# Patient Record
Sex: MALE | Race: BLACK OR AFRICAN AMERICAN | NOT HISPANIC OR LATINO | Employment: FULL TIME | URBAN - METROPOLITAN AREA
[De-identification: names, ages, dates, MRNs, and addresses within clinical notes are randomized per-mention and may not be internally consistent; named-entity substitution may affect disease eponyms.]

---

## 2023-05-15 ENCOUNTER — OFFICE VISIT (OUTPATIENT)
Dept: PRIMARY CARE | Facility: CLINIC | Age: 43
End: 2023-05-15
Payer: COMMERCIAL

## 2023-05-15 VITALS
WEIGHT: 315 LBS | RESPIRATION RATE: 16 BRPM | SYSTOLIC BLOOD PRESSURE: 130 MMHG | DIASTOLIC BLOOD PRESSURE: 90 MMHG | BODY MASS INDEX: 44.6 KG/M2 | HEART RATE: 70 BPM | TEMPERATURE: 99.2 F

## 2023-05-15 DIAGNOSIS — D50.9 IRON DEFICIENCY ANEMIA, UNSPECIFIED IRON DEFICIENCY ANEMIA TYPE: ICD-10-CM

## 2023-05-15 DIAGNOSIS — E78.49 OTHER HYPERLIPIDEMIA: ICD-10-CM

## 2023-05-15 DIAGNOSIS — G47.33 OBSTRUCTIVE SLEEP APNEA SYNDROME: ICD-10-CM

## 2023-05-15 DIAGNOSIS — M75.112 INCOMPLETE TEAR OF LEFT ROTATOR CUFF, UNSPECIFIED WHETHER TRAUMATIC: ICD-10-CM

## 2023-05-15 DIAGNOSIS — R73.03 PREDIABETES: ICD-10-CM

## 2023-05-15 DIAGNOSIS — R03.0 BLOOD PRESSURE ELEVATED WITHOUT HISTORY OF HTN: Primary | ICD-10-CM

## 2023-05-15 PROBLEM — D64.9 ANEMIA: Status: ACTIVE | Noted: 2023-05-15

## 2023-05-15 PROBLEM — N52.9 IMPOTENCE: Status: ACTIVE | Noted: 2023-05-15

## 2023-05-15 PROBLEM — E66.01 MORBID OBESITY (MULTI): Status: ACTIVE | Noted: 2023-05-15

## 2023-05-15 PROBLEM — E78.5 HYPERLIPIDEMIA: Status: ACTIVE | Noted: 2023-05-15

## 2023-05-15 PROCEDURE — 99396 PREV VISIT EST AGE 40-64: CPT | Performed by: INTERNAL MEDICINE

## 2023-05-15 RX ORDER — CEPHALEXIN 500 MG/1
500 CAPSULE ORAL EVERY 6 HOURS
Qty: 28 CAPSULE | Refills: 0 | COMMUNITY
Start: 2023-03-30 | End: 2023-04-06

## 2023-05-15 NOTE — PROGRESS NOTES
Jose Maria Apodaca is a 43 y.o. male   NPV    Patient with a past medical history of Elevated BP without diagnosis of hypertension, Prediabetes, ID Anemia, Vit D Deficiency, Morbid Obesity    C/o left shoulder pain  C/o food getting stuck in throat, lasted for a month, but then got better  Wife says he snores and stops breathing  Blood on wipe  No stomach/ pain     No chest pain/  SOB/ dizziness  BM OK  Energy level ok  Appetite OK             Review of Systems     Constitutional: not feeling poorly, no fever, no recent weight gain and no recent weight loss. Morbidly Obese  Eyes: no blurred vision and no diplopia.   ENT: no hearing loss, no tinnitus, no earache, no sore throat, no hoarseness and no swollen glands in the neck.   Cardiovascular: no chest pain, no tightness or heavy pressure, no shortness of breath, no palpitations and no lower extremity edema.   Respiratory: no cough, wheezing or shortness of breath at rest or exertion  Gastrointestinal: no change in bowel habits, no diarrhea, no constipation, no bloody stools, no nausea, no vomiting, no abdominal pain, no signs and symptoms of ulcer disease, no nasim colored stools and no intolerance to fatty foods.   Genitourinary: no urinary frequency, no dysuria, no hematuria, no burning sensation during urination, urinary stream is not smaller and urinary stream does not start and stop.   Musculoskeletal: no arthralgias, no joint stiffness, no muscle weakness, no back pain and no difficulty walking.   Skin: no rashes, no change in skin color and pigmentation, no skin lesions and no skin lumps.   Neurological: no headaches, no dizziness, no seizures, no tingling, no numbness, no signs and symptoms of stroke and no limb weakness.   Psychiatric: no confusion, no memory lapses or loss, no depression and no sleep disturbances.   Endocrine: no goiter, no thyroid disorder, no diabetes mellitus, no excessive thirst, no dry skin, no cold intolerance, no heat intolerance and no  increased urinary frequency.   Hematologic/Lymphatic: is not slow to heal, does not bleed easily, does not bruise easily, no thrombophlebitis, no anemia and no history of blood transfusion.   All other systems have been reviewed and are negative for complaint.     Vitals:    05/15/23 1527   Temp: 37.3 °C (99.2 °F)        Physical Exam     Constitutional   General appearance: Alert and in no acute distress. Morbidly Obese  Eyes   Inspection of eyes: Sclera and conjunctiva were normal.    Pupil exam: Pupils were equal in size. Extraocular movements were intact.   Pulmonary   Respiratory assessment: No respiratory distress, normal respiratory rhythm and effort.    Auscultation of Lungs: Clear bilateral breath sounds.   Cardiovascular   Auscultation of heart: Apical pulse normal, heart rate and rhythm normal, normal S1 and S2, no murmurs and no pericardial rub.    Exam for edema: No peripheral edema.   Abdomen   Abdominal Exam: No bruits, normal bowel sounds, soft, non-tender, no abdominal mass palpated.    Liver and Spleen exam: No hepato-splenomegaly.   Musculoskeletal   Examination of gait: Normal.    Inspection of digits and nails: No clubbing or cyanosis of the fingernails.    Inspection/palpation of joints, bones and muscles: No joint swelling. Normal movement of all extremities.   Skin   Skin inspection: Normal skin color and pigmentation, normal skin turgor and no visible rash.   Neurologic   Cranial nerves: Nerves 2-12 were intact, no focal neuro defects.   Psychiatric   Orientation: Oriented to person, place, and time.    Mood and affect: Normal.       Assessment/Plan          # Elevated Blood Pressure  # Suspected Sleep apnea  # Morbid Obesity  # HLD  # DANIEL  # Pre Diabetes  # Rotator Cuff Tear    Blood work  Check BP at home  Bring readings and monitor  X ray shoulder  Sleep study  GI consult  Ortho referral    f6w

## 2023-06-16 ENCOUNTER — LAB (OUTPATIENT)
Dept: LAB | Facility: LAB | Age: 43
End: 2023-06-16
Payer: COMMERCIAL

## 2023-06-16 DIAGNOSIS — R03.0 BLOOD PRESSURE ELEVATED WITHOUT HISTORY OF HTN: ICD-10-CM

## 2023-06-16 DIAGNOSIS — D50.9 IRON DEFICIENCY ANEMIA, UNSPECIFIED IRON DEFICIENCY ANEMIA TYPE: ICD-10-CM

## 2023-06-16 DIAGNOSIS — E78.49 OTHER HYPERLIPIDEMIA: ICD-10-CM

## 2023-06-16 DIAGNOSIS — R73.03 PREDIABETES: ICD-10-CM

## 2023-06-16 LAB
ALANINE AMINOTRANSFERASE (SGPT) (U/L) IN SER/PLAS: 30 U/L (ref 10–52)
ALBUMIN (G/DL) IN SER/PLAS: 4.3 G/DL (ref 3.4–5)
ALKALINE PHOSPHATASE (U/L) IN SER/PLAS: 44 U/L (ref 33–120)
ANION GAP IN SER/PLAS: 13 MMOL/L (ref 10–20)
ASPARTATE AMINOTRANSFERASE (SGOT) (U/L) IN SER/PLAS: 15 U/L (ref 9–39)
BILIRUBIN TOTAL (MG/DL) IN SER/PLAS: 0.6 MG/DL (ref 0–1.2)
CALCIUM (MG/DL) IN SER/PLAS: 9.7 MG/DL (ref 8.6–10.6)
CARBON DIOXIDE, TOTAL (MMOL/L) IN SER/PLAS: 28 MMOL/L (ref 21–32)
CHLORIDE (MMOL/L) IN SER/PLAS: 104 MMOL/L (ref 98–107)
CHOLESTEROL (MG/DL) IN SER/PLAS: 218 MG/DL (ref 0–199)
CHOLESTEROL IN HDL (MG/DL) IN SER/PLAS: 56.1 MG/DL
CHOLESTEROL/HDL RATIO: 3.9
CREATININE (MG/DL) IN SER/PLAS: 1.17 MG/DL (ref 0.5–1.3)
ERYTHROCYTE DISTRIBUTION WIDTH (RATIO) BY AUTOMATED COUNT: 13.6 % (ref 11.5–14.5)
ERYTHROCYTE MEAN CORPUSCULAR HEMOGLOBIN CONCENTRATION (G/DL) BY AUTOMATED: 30.1 G/DL (ref 32–36)
ERYTHROCYTE MEAN CORPUSCULAR VOLUME (FL) BY AUTOMATED COUNT: 92 FL (ref 80–100)
ERYTHROCYTES (10*6/UL) IN BLOOD BY AUTOMATED COUNT: 4.8 X10E12/L (ref 4.5–5.9)
FERRITIN (UG/LL) IN SER/PLAS: 291 UG/L (ref 20–300)
GFR MALE: 80 ML/MIN/1.73M2
GLUCOSE (MG/DL) IN SER/PLAS: 87 MG/DL (ref 74–99)
HEMATOCRIT (%) IN BLOOD BY AUTOMATED COUNT: 44.2 % (ref 41–52)
HEMOGLOBIN (G/DL) IN BLOOD: 13.3 G/DL (ref 13.5–17.5)
IRON (UG/DL) IN SER/PLAS: 80 UG/DL (ref 35–150)
IRON BINDING CAPACITY (UG/DL) IN SER/PLAS: 383 UG/DL (ref 240–445)
IRON SATURATION (%) IN SER/PLAS: 21 % (ref 25–45)
LDL: 138 MG/DL (ref 0–99)
LEUKOCYTES (10*3/UL) IN BLOOD BY AUTOMATED COUNT: 8 X10E9/L (ref 4.4–11.3)
NRBC (PER 100 WBCS) BY AUTOMATED COUNT: 0 /100 WBC (ref 0–0)
PLATELETS (10*3/UL) IN BLOOD AUTOMATED COUNT: 363 X10E9/L (ref 150–450)
POTASSIUM (MMOL/L) IN SER/PLAS: 4.6 MMOL/L (ref 3.5–5.3)
PROTEIN TOTAL: 8.4 G/DL (ref 6.4–8.2)
SODIUM (MMOL/L) IN SER/PLAS: 140 MMOL/L (ref 136–145)
THYROTROPIN (MIU/L) IN SER/PLAS BY DETECTION LIMIT <= 0.05 MIU/L: 0.71 MIU/L (ref 0.44–3.98)
TRIGLYCERIDE (MG/DL) IN SER/PLAS: 119 MG/DL (ref 0–149)
UREA NITROGEN (MG/DL) IN SER/PLAS: 18 MG/DL (ref 6–23)
VLDL: 24 MG/DL (ref 0–40)

## 2023-06-16 PROCEDURE — 80061 LIPID PANEL: CPT

## 2023-06-16 PROCEDURE — 83036 HEMOGLOBIN GLYCOSYLATED A1C: CPT

## 2023-06-16 PROCEDURE — 84443 ASSAY THYROID STIM HORMONE: CPT

## 2023-06-16 PROCEDURE — 85027 COMPLETE CBC AUTOMATED: CPT

## 2023-06-16 PROCEDURE — 83550 IRON BINDING TEST: CPT

## 2023-06-16 PROCEDURE — 36415 COLL VENOUS BLD VENIPUNCTURE: CPT

## 2023-06-16 PROCEDURE — 83540 ASSAY OF IRON: CPT

## 2023-06-16 PROCEDURE — 80053 COMPREHEN METABOLIC PANEL: CPT

## 2023-06-16 PROCEDURE — 82728 ASSAY OF FERRITIN: CPT

## 2023-06-17 LAB
ESTIMATED AVERAGE GLUCOSE FOR HBA1C: 140 MG/DL
HEMOGLOBIN A1C/HEMOGLOBIN TOTAL IN BLOOD: 6.5 %

## 2023-06-26 ENCOUNTER — OFFICE VISIT (OUTPATIENT)
Dept: PRIMARY CARE | Facility: CLINIC | Age: 43
End: 2023-06-26
Payer: COMMERCIAL

## 2023-06-26 VITALS
HEART RATE: 83 BPM | WEIGHT: 312.6 LBS | BODY MASS INDEX: 43.6 KG/M2 | DIASTOLIC BLOOD PRESSURE: 87 MMHG | TEMPERATURE: 98.6 F | SYSTOLIC BLOOD PRESSURE: 131 MMHG

## 2023-06-26 DIAGNOSIS — I10 HTN (HYPERTENSION), BENIGN: ICD-10-CM

## 2023-06-26 DIAGNOSIS — R73.03 PREDIABETES: Primary | ICD-10-CM

## 2023-06-26 DIAGNOSIS — E78.49 OTHER HYPERLIPIDEMIA: ICD-10-CM

## 2023-06-26 PROBLEM — R03.0 BLOOD PRESSURE ELEVATED WITHOUT HISTORY OF HTN: Status: RESOLVED | Noted: 2023-05-15 | Resolved: 2023-06-26

## 2023-06-26 PROCEDURE — 3079F DIAST BP 80-89 MM HG: CPT | Performed by: INTERNAL MEDICINE

## 2023-06-26 PROCEDURE — 99214 OFFICE O/P EST MOD 30 MIN: CPT | Performed by: INTERNAL MEDICINE

## 2023-06-26 PROCEDURE — 3075F SYST BP GE 130 - 139MM HG: CPT | Performed by: INTERNAL MEDICINE

## 2023-06-26 RX ORDER — CHLORTHALIDONE 25 MG/1
12.5 TABLET ORAL DAILY
Qty: 15 TABLET | Refills: 5 | Status: SHIPPED | OUTPATIENT
Start: 2023-06-26 | End: 2023-12-11 | Stop reason: SDUPTHER

## 2023-06-26 NOTE — PROGRESS NOTES
Jose Maria Apodaca is a 43 y.o. male   Patient with a past medical history of Elevated BP without diagnosis of hypertension, Prediabetes, ID Anemia, Vit D Deficiency, Morbid Obesity    C/o left shoulder pain  C/o food getting stuck in throat, lasted for a month, but then got better  Wife says he snores and stops breathing  Blood on wipe  No stomach/ pain     No chest pain/  SOB/ dizziness  BM OK  Energy level ok  Appetite OK             Review of Systems     Constitutional: not feeling poorly, no fever, no recent weight gain and no recent weight loss. Morbidly Obese  Eyes: no blurred vision and no diplopia.   ENT: no hearing loss, no tinnitus, no earache, no sore throat, no hoarseness and no swollen glands in the neck.   Cardiovascular: no chest pain, no tightness or heavy pressure, no shortness of breath, no palpitations and no lower extremity edema.   Respiratory: no cough, wheezing or shortness of breath at rest or exertion  Gastrointestinal: no change in bowel habits, no diarrhea, no constipation, no bloody stools, no nausea, no vomiting, no abdominal pain, no signs and symptoms of ulcer disease, no nasim colored stools and no intolerance to fatty foods.   Genitourinary: no urinary frequency, no dysuria, no hematuria, no burning sensation during urination, urinary stream is not smaller and urinary stream does not start and stop.   Musculoskeletal: no arthralgias, no joint stiffness, no muscle weakness, no back pain and no difficulty walking.   Skin: no rashes, no change in skin color and pigmentation, no skin lesions and no skin lumps.   Neurological: no headaches, no dizziness, no seizures, no tingling, no numbness, no signs and symptoms of stroke and no limb weakness.   Psychiatric: no confusion, no memory lapses or loss, no depression and no sleep disturbances.   Endocrine: no goiter, no thyroid disorder, no diabetes mellitus, no excessive thirst, no dry skin, no cold intolerance, no heat intolerance and no  increased urinary frequency.   Hematologic/Lymphatic: is not slow to heal, does not bleed easily, does not bruise easily, no thrombophlebitis, no anemia and no history of blood transfusion.   All other systems have been reviewed and are negative for complaint.     Vitals:    06/26/23 1619   BP: 131/87   Pulse: 83   Temp: 37 °C (98.6 °F)        Physical Exam     Constitutional   General appearance: Alert and in no acute distress. Morbidly Obese  Eyes   Inspection of eyes: Sclera and conjunctiva were normal.    Pupil exam: Pupils were equal in size. Extraocular movements were intact.   Pulmonary   Respiratory assessment: No respiratory distress, normal respiratory rhythm and effort.    Auscultation of Lungs: Clear bilateral breath sounds.   Cardiovascular   Auscultation of heart: Apical pulse normal, heart rate and rhythm normal, normal S1 and S2, no murmurs and no pericardial rub.    Exam for edema: No peripheral edema.   Abdomen   Abdominal Exam: No bruits, normal bowel sounds, soft, non-tender, no abdominal mass palpated.    Liver and Spleen exam: No hepato-splenomegaly.   Musculoskeletal   Examination of gait: Normal.    Inspection of digits and nails: No clubbing or cyanosis of the fingernails.    Inspection/palpation of joints, bones and muscles: No joint swelling. Normal movement of all extremities.   Skin   Skin inspection: Normal skin color and pigmentation, normal skin turgor and no visible rash.   Neurologic   Cranial nerves: Nerves 2-12 were intact, no focal neuro defects.   Psychiatric   Orientation: Oriented to person, place, and time.    Mood and affect: Normal.       Assessment/Plan          # HTN  Start Chlorthalidone     # Suspected Sleep apnea  Sleep study pending in August    # Morbid Obesity  # HLD    # DANIEL  Stable numbers  Will see GI next month    # Pre Diabetes  A1c of 6.5  Work on weight loss    # Shoulder impingement  Resolved with cortisone by Ortho        f6w

## 2023-09-25 ENCOUNTER — OFFICE VISIT (OUTPATIENT)
Dept: PRIMARY CARE | Facility: CLINIC | Age: 43
End: 2023-09-25
Payer: COMMERCIAL

## 2023-09-25 VITALS
RESPIRATION RATE: 16 BRPM | SYSTOLIC BLOOD PRESSURE: 120 MMHG | BODY MASS INDEX: 43.43 KG/M2 | DIASTOLIC BLOOD PRESSURE: 80 MMHG | WEIGHT: 311.4 LBS | HEART RATE: 72 BPM | TEMPERATURE: 98.4 F

## 2023-09-25 DIAGNOSIS — E78.49 OTHER HYPERLIPIDEMIA: Primary | ICD-10-CM

## 2023-09-25 DIAGNOSIS — I10 HTN (HYPERTENSION), BENIGN: ICD-10-CM

## 2023-09-25 DIAGNOSIS — R73.03 PREDIABETES: ICD-10-CM

## 2023-09-25 PROCEDURE — 3079F DIAST BP 80-89 MM HG: CPT | Performed by: INTERNAL MEDICINE

## 2023-09-25 PROCEDURE — 3074F SYST BP LT 130 MM HG: CPT | Performed by: INTERNAL MEDICINE

## 2023-09-25 PROCEDURE — 99214 OFFICE O/P EST MOD 30 MIN: CPT | Performed by: INTERNAL MEDICINE

## 2023-09-25 PROCEDURE — 3008F BODY MASS INDEX DOCD: CPT | Performed by: INTERNAL MEDICINE

## 2023-09-25 NOTE — PROGRESS NOTES
Jose Maria Apodaca is a 43 y.o. male   Patient with a past medical history of Elevated BP without diagnosis of hypertension, Prediabetes, ID Anemia, Vit D Deficiency, Morbid Obesity    Saw GI  Started fiber  Bowels better, no more blood in stool    Did get the sleep study done  Results pending    No chest pain/  SOB/ dizziness  BM OK  Energy level ok  Appetite OK             Review of Systems     Constitutional: not feeling poorly, no fever, no recent weight gain and no recent weight loss. Morbidly Obese  Eyes: no blurred vision and no diplopia.   ENT: no hearing loss, no tinnitus, no earache, no sore throat, no hoarseness and no swollen glands in the neck.   Cardiovascular: no chest pain, no tightness or heavy pressure, no shortness of breath, no palpitations and no lower extremity edema.   Respiratory: no cough, wheezing or shortness of breath at rest or exertion  Gastrointestinal: no change in bowel habits, no diarrhea, no constipation, no bloody stools, no nausea, no vomiting, no abdominal pain, no signs and symptoms of ulcer disease, no nasim colored stools and no intolerance to fatty foods.   Genitourinary: no urinary frequency, no dysuria, no hematuria, no burning sensation during urination, urinary stream is not smaller and urinary stream does not start and stop.   Musculoskeletal: no arthralgias, no joint stiffness, no muscle weakness, no back pain and no difficulty walking.   Skin: no rashes, no change in skin color and pigmentation, no skin lesions and no skin lumps.   Neurological: no headaches, no dizziness, no seizures, no tingling, no numbness, no signs and symptoms of stroke and no limb weakness.   Psychiatric: no confusion, no memory lapses or loss, no depression and no sleep disturbances.   Endocrine: no goiter, no thyroid disorder, no diabetes mellitus, no excessive thirst, no dry skin, no cold intolerance, no heat intolerance and no increased urinary frequency.   Hematologic/Lymphatic: is not slow to  heal, does not bleed easily, does not bruise easily, no thrombophlebitis, no anemia and no history of blood transfusion.   All other systems have been reviewed and are negative for complaint.     Vitals:    09/25/23 1514   Temp: 36.9 °C (98.4 °F)        Physical Exam     Constitutional   General appearance: Alert and in no acute distress. Morbidly Obese  Eyes   Inspection of eyes: Sclera and conjunctiva were normal.    Pupil exam: Pupils were equal in size. Extraocular movements were intact.   Pulmonary   Respiratory assessment: No respiratory distress, normal respiratory rhythm and effort.    Auscultation of Lungs: Clear bilateral breath sounds.   Cardiovascular   Auscultation of heart: Apical pulse normal, heart rate and rhythm normal, normal S1 and S2, no murmurs and no pericardial rub.    Exam for edema: No peripheral edema.   Abdomen   Abdominal Exam: No bruits, normal bowel sounds, soft, non-tender, no abdominal mass palpated.    Liver and Spleen exam: No hepato-splenomegaly.   Musculoskeletal   Examination of gait: Normal.    Inspection of digits and nails: No clubbing or cyanosis of the fingernails.    Inspection/palpation of joints, bones and muscles: No joint swelling. Normal movement of all extremities.   Skin   Skin inspection: Normal skin color and pigmentation, normal skin turgor and no visible rash.   Neurologic   Cranial nerves: Nerves 2-12 were intact, no focal neuro defects.   Psychiatric   Orientation: Oriented to person, place, and time.    Mood and affect: Normal.       Assessment/Plan          # HTN  Continue Chlorthalidone     # Suspected Sleep apnea  Sleep study results are still pending    # Morbid Obesity  # HLD  Watch salt, avoid excessive caffeine  Avoid processed meats/ sugars/juices Instead eat fresh fruit  Add walnuts and almonds to your diet  exercise 6 days a week for 30 minutes      # DANIEL  Stable numbers      # Pre Diabetes  A1c of 6.5  Continue exercise/ weight loss

## 2023-10-01 PROBLEM — G47.19 EXCESSIVE DAYTIME SLEEPINESS: Status: ACTIVE | Noted: 2023-10-01

## 2023-10-01 PROBLEM — M25.512 LEFT SHOULDER PAIN: Status: ACTIVE | Noted: 2023-10-01

## 2023-10-01 PROBLEM — K62.5 RECTAL BLEEDING: Status: ACTIVE | Noted: 2023-10-01

## 2023-10-01 PROBLEM — K21.9 GERD (GASTROESOPHAGEAL REFLUX DISEASE): Status: ACTIVE | Noted: 2023-10-01

## 2023-10-01 PROBLEM — G47.30 SLEEP APNEA: Status: ACTIVE | Noted: 2023-10-01

## 2023-10-01 RX ORDER — ALBUTEROL SULFATE 90 UG/1
2 AEROSOL, METERED RESPIRATORY (INHALATION) EVERY 4 HOURS PRN
COMMUNITY
Start: 2019-10-07

## 2023-10-01 RX ORDER — LANOLIN ALCOHOL/MO/W.PET/CERES
1 CREAM (GRAM) TOPICAL DAILY
COMMUNITY
Start: 2019-11-14

## 2023-10-01 RX ORDER — CHLORHEXIDINE GLUCONATE ORAL RINSE 1.2 MG/ML
15 SOLUTION DENTAL 2 TIMES DAILY
COMMUNITY
Start: 2016-01-07

## 2023-10-06 ENCOUNTER — PROCEDURE VISIT (OUTPATIENT)
Dept: SLEEP MEDICINE | Facility: HOSPITAL | Age: 43
End: 2023-10-06
Payer: COMMERCIAL

## 2023-10-06 VITALS — HEIGHT: 71 IN | BODY MASS INDEX: 43.4 KG/M2 | WEIGHT: 310 LBS

## 2023-10-06 DIAGNOSIS — G47.33 OBSTRUCTIVE SLEEP APNEA (ADULT) (PEDIATRIC): ICD-10-CM

## 2023-10-06 PROCEDURE — 95806 SLEEP STUDY UNATT&RESP EFFT: CPT | Performed by: STUDENT IN AN ORGANIZED HEALTH CARE EDUCATION/TRAINING PROGRAM

## 2023-10-06 NOTE — PROGRESS NOTES
The patient received equipment and instructions for use of the [facility] Nomad HSAT device. The patient was instructed how to apply the effort belts, cannula, thermistor. It was also explained how the Nomad and oximeter components work.  The patient was asked to record their sleep for an 8-hour period.     The patient was informed of their responsibility for the device and acknowledged this by signing the HSAT device contract. The patient was asked to return the device on 10/09/2023 between the hours of 8:00 - 3:00 pm to the [facility].     The patient was instructed to call 911 as usual for any medical- emergencies while at home.  The patient was also given a phone number for troubleshooting when using the device in case there were additional questions.

## 2023-10-07 PROCEDURE — 95806 SLEEP STUDY UNATT&RESP EFFT: CPT | Performed by: STUDENT IN AN ORGANIZED HEALTH CARE EDUCATION/TRAINING PROGRAM

## 2023-10-11 DIAGNOSIS — G47.33 OBSTRUCTIVE SLEEP APNEA SYNDROME: Primary | ICD-10-CM

## 2023-10-12 ENCOUNTER — TELEPHONE (OUTPATIENT)
Dept: SLEEP MEDICINE | Facility: HOSPITAL | Age: 43
End: 2023-10-12
Payer: COMMERCIAL

## 2023-10-12 DIAGNOSIS — G47.33 OSA (OBSTRUCTIVE SLEEP APNEA): ICD-10-CM

## 2023-10-12 NOTE — TELEPHONE ENCOUNTER
Called patient regarding the sleep study result. Start APAP therapy with [facility]. Patient scheduled a follow-up appointment with Dr. Price  on 1/26/24. Patient verbalized understanding, all questions answered.

## 2023-11-27 ENCOUNTER — CONSULT (OUTPATIENT)
Dept: ALLERGY | Facility: CLINIC | Age: 43
End: 2023-11-27
Payer: COMMERCIAL

## 2023-11-27 VITALS
HEIGHT: 71 IN | DIASTOLIC BLOOD PRESSURE: 83 MMHG | BODY MASS INDEX: 44.1 KG/M2 | SYSTOLIC BLOOD PRESSURE: 119 MMHG | WEIGHT: 315 LBS | OXYGEN SATURATION: 97 % | TEMPERATURE: 98.1 F | HEART RATE: 94 BPM

## 2023-11-27 DIAGNOSIS — T78.1XXA ADVERSE FOOD REACTION, INITIAL ENCOUNTER: ICD-10-CM

## 2023-11-27 DIAGNOSIS — L50.8 CHRONIC URTICARIA: Primary | ICD-10-CM

## 2023-11-27 DIAGNOSIS — Z91.013 SHELLFISH ALLERGY: ICD-10-CM

## 2023-11-27 PROCEDURE — 99204 OFFICE O/P NEW MOD 45 MIN: CPT | Performed by: ALLERGY & IMMUNOLOGY

## 2023-11-27 PROCEDURE — 95004 PERQ TESTS W/ALRGNC XTRCS: CPT | Performed by: ALLERGY & IMMUNOLOGY

## 2023-11-27 RX ORDER — EPINEPHRINE 0.3 MG/.3ML
INJECTION INTRAMUSCULAR
Qty: 2 EACH | Refills: 1 | Status: SHIPPED | OUTPATIENT
Start: 2023-11-27

## 2023-11-27 RX ORDER — CETIRIZINE HYDROCHLORIDE 10 MG/1
10 TABLET ORAL DAILY PRN
Qty: 30 TABLET | Refills: 11 | Status: SHIPPED | OUTPATIENT
Start: 2023-11-27 | End: 2024-11-26

## 2023-11-27 ASSESSMENT — ENCOUNTER SYMPTOMS
CONSTITUTIONAL NEGATIVE: 1
CARDIOVASCULAR NEGATIVE: 1
HEMATOLOGIC/LYMPHATIC NEGATIVE: 1
EYES NEGATIVE: 1
GASTROINTESTINAL NEGATIVE: 1
MUSCULOSKELETAL NEGATIVE: 1
ALLERGIC/IMMUNOLOGIC NEGATIVE: 1
RESPIRATORY NEGATIVE: 1

## 2023-11-27 ASSESSMENT — PAIN SCALES - GENERAL: PAINLEVEL: 0-NO PAIN

## 2023-11-27 NOTE — LETTER
November 27, 2023     Lizeth Parikh MD  [address]   [facility] [address]  [facility] [address]    Patient: Jose Maria Apodaca   YOB: 1980   Date of Visit: 11/27/2023       Dear Dr. Lizeth Parikh MD:    Thank you for referring Jose Maria Apodaca to me for evaluation. Below are my notes for this consultation.  If you have questions, please do not hesitate to call me. I look forward to following your patient along with you.       Sincerely,     Princess SHARATH Santana MD      CC: No Recipients  ______________________________________________________________________________________    Jose Maria Apodaca presents for initial evaluation today.      Jose Maria Apodaca was seen at the request of Lizeth Parikh MD for a chief complaint of hives and possible shellfish allergy; a report with my findings is being sent via written or electronic means to Lizeth Parikh MD with my recommendations for treatment    He provides the following history:    He states that he has intermittent hives which he cannot attribute to any specific trigger.  He notes that symptoms have been present for a few years. He notes that symptoms occur intermittently, last occurring a week ago after a URI. He denies use of NSAIDS.  He denies starting any new medications prior to the onset of hives. He denies personal or family history of thyroid disease or autoimmunity.       Rhinitis: Denies     Asthma:  He has had asthma since age 11-13.  He is not on a controller.  He uses Albuterol about a couple times a year, and states that asthma is otherwise well-controlled.     Eczema: Denies     Food allergy:  He notes he gets shortness of breath when he cooks shrimp for his wife.  He does not eat shrimp, but cannot recall any reactions.  He tolerates finned fish.     Venom allergy:  Denies     Drug allergy: Denies     Environmental History:  Type of home:  House  Pets in the house: Dog   Mold or moisture in the home: None  Cigarette exposure in the home:  No  Occupation/School:  "Works for medical [employer]    Pertinent Allergy/Immunology family history:  Denies     Review of Systems   Constitutional: Negative.    HENT: Negative.     Eyes: Negative.    Respiratory: Negative.     Cardiovascular: Negative.    Gastrointestinal: Negative.    Musculoskeletal: Negative.    Skin: Negative.    Allergic/Immunologic: Negative.    Hematological: Negative.      Vital signs:  /83   Pulse 94   Temp 36.7 °C (98.1 °F)   Ht 1.803 m (5' 11\")   Wt 143 kg (315 lb)   SpO2 97%   BMI 43.93 kg/m²     Physical Exam:  GENERAL: Alert, oriented and in no acute distress.     HEENT: EYES: No conjunctival injection or cobblestoning. Nose: nasal turbinates mildly edematous and are not boggy.  There is no mucous stranding, polyps, or blood    noted. EARS: Tympanic membranes are clear. MOUTH: moist and pink with no exudates, ulcers, or thrush. NECK: is supple, without adenopathy.  No upper airway stridor noted.       HEART: regular rate and rhythm.       LUNGS: Clear to auscultation bilaterally. No wheezing, rhonchi or rales.        ABDOMEN: Positive bowel sounds, soft, nontender, nondistended.       EXTREMITIES: No clubbing or edema.        NEURO:  Normal affect.  Gait normal.      SKIN: No rash, hives, or angioedema noted      Food Allergy Test Results  Controls  Positive Histamine: 6/40  Negative Saline: 4/15  Shellfish  Clam: 4/30  Crab: 10/40  Lobster: 4/25  Shrimp: 7/40 (satellite)       Impression:  1. Chronic urticaria    2. Adverse food reaction, initial encounter    3. Shellfish allergy      Assessment and Plan:  Chronic Urticaria: We discussed that the cause of chronic urticaria is immune-mediated, in some cases caused by antibodies targeted against self IgE-receptor or self-IgE.  Chronic urticaria is less likely to be caused by environmental or food allergy.  Triggers can include use of NSAIDS, alcohol, infection, stress, etc.  Recommend starting Cetirtizine 10 mg daily to BID. We also discussed " that he should should stay up to date with age-appropriate cancer screenings, as underlying malignancy can also be a cause of urticaria. I have also counseled that he should avoid NSAIDS, aspirin, and narcotic pain medications as these medications can worsen or prolong hives through the mechanism of non-specific mast cell degranulation.  We have ordered the following labs to further evaluate: serum tryptase, IgE, antithyroid antibodies, CU index, KARAN with reflex to CHARLIE. Of note, recent TSH was normal.     Adverse reaction to food, initial encounter; shellfish allergy :  Shellfish allergy testing today was positive.  Recommend strictly avoiding shellfish.  EpiPen prescribed, EpiPen teaching performed, food allergy action plan provided.

## 2023-11-27 NOTE — PATIENT INSTRUCTIONS
Today we discussed chronic urticaria (chronic hives) which is immune-mediated.     Your shellfish allergy testing was positive to shrimp and crab.    Strictly avoid shellfish. Read ingredient labels. Notify friends, family, and restaurants of food allergy.  Carry auto-injectable epinephrine at all times of possible exposure    If your EpiPen prescription is not covered well by insurance, please let us know and we can send in an AuviQ prescription instead.  Our nurse line phone number is [phone]    For hives, we recommend the followin. Take Cetirizine 10 mg once to twice daily (Zyrtec)  2.  Please notify us if symptoms do not improve (and please take a photo)  3. Recommend avoiding NSAIDS (Ibuprofen, Motrin, Aleve, etc) as these can trigger hives  4. Please have labs drawn. Please note that some labs will come back sooner than others.  We will contact you when all labs have returned so that we can discuss results.     We would like to see you in follow up in 3 months or sooner if needed

## 2023-11-27 NOTE — PROGRESS NOTES
"Jose Maria Apodaca presents for initial evaluation today.      Jose Maria Apodaca was seen at the request of Lizeth Parikh MD for a chief complaint of hives and possible shellfish allergy; a report with my findings is being sent via written or electronic means to Lizeth Parikh MD with my recommendations for treatment    He provides the following history:    He states that he has intermittent hives which he cannot attribute to any specific trigger.  He notes that symptoms have been present for a few years. He notes that symptoms occur intermittently, last occurring a week ago after a URI. He denies use of NSAIDS.  He denies starting any new medications prior to the onset of hives. He denies personal or family history of thyroid disease or autoimmunity.       Rhinitis: Denies     Asthma:  He has had asthma since age 11-13.  He is not on a controller.  He uses Albuterol about a couple times a year, and states that asthma is otherwise well-controlled.     Eczema: Denies     Food allergy:  He notes he gets shortness of breath when he cooks shrimp for his wife.  He does not eat shrimp, but cannot recall any reactions.  He tolerates finned fish.     Venom allergy:  Denies     Drug allergy: Denies     Environmental History:  Type of home:  House  Pets in the house: Dog   Mold or moisture in the home: None  Cigarette exposure in the home:  No  Occupation/School: Works for medical supply company    Pertinent Allergy/Immunology family history:  Denies     Review of Systems   Constitutional: Negative.    HENT: Negative.     Eyes: Negative.    Respiratory: Negative.     Cardiovascular: Negative.    Gastrointestinal: Negative.    Musculoskeletal: Negative.    Skin: Negative.    Allergic/Immunologic: Negative.    Hematological: Negative.      Vital signs:  /83   Pulse 94   Temp 36.7 °C (98.1 °F)   Ht 1.803 m (5' 11\")   Wt 143 kg (315 lb)   SpO2 97%   BMI 43.93 kg/m²     Physical Exam:  GENERAL: Alert, oriented and in no acute " distress.     HEENT: EYES: No conjunctival injection or cobblestoning. Nose: nasal turbinates mildly edematous and are not boggy.  There is no mucous stranding, polyps, or blood    noted. EARS: Tympanic membranes are clear. MOUTH: moist and pink with no exudates, ulcers, or thrush. NECK: is supple, without adenopathy.  No upper airway stridor noted.       HEART: regular rate and rhythm.       LUNGS: Clear to auscultation bilaterally. No wheezing, rhonchi or rales.        ABDOMEN: Positive bowel sounds, soft, nontender, nondistended.       EXTREMITIES: No clubbing or edema.        NEURO:  Normal affect.  Gait normal.      SKIN: No rash, hives, or angioedema noted      Food Allergy Test Results  Controls  Positive Histamine: 6/40  Negative Saline: 4/15  Shellfish  Clam: 4/30  Crab: 10/40  Lobster: 4/25  Shrimp: 7/40 (satellite)       Impression:  1. Chronic urticaria    2. Adverse food reaction, initial encounter    3. Shellfish allergy      Assessment and Plan:  Chronic Urticaria: We discussed that the cause of chronic urticaria is immune-mediated, in some cases caused by antibodies targeted against self IgE-receptor or self-IgE.  Chronic urticaria is less likely to be caused by environmental or food allergy.  Triggers can include use of NSAIDS, alcohol, infection, stress, etc.  Recommend starting Cetirtizine 10 mg daily to BID. We also discussed that he should should stay up to date with age-appropriate cancer screenings, as underlying malignancy can also be a cause of urticaria. I have also counseled that he should avoid NSAIDS, aspirin, and narcotic pain medications as these medications can worsen or prolong hives through the mechanism of non-specific mast cell degranulation.  We have ordered the following labs to further evaluate: serum tryptase, IgE, antithyroid antibodies, CU index, KARAN with reflex to CHARLIE. Of note, recent TSH was normal.     Adverse reaction to food, initial encounter; shellfish allergy :   Shellfish allergy testing today was positive.  Recommend strictly avoiding shellfish.  EpiPen prescribed, EpiPen teaching performed, food allergy action plan provided.

## 2023-11-29 ENCOUNTER — LAB (OUTPATIENT)
Dept: LAB | Facility: LAB | Age: 43
End: 2023-11-29
Payer: COMMERCIAL

## 2023-11-29 DIAGNOSIS — L50.8 CHRONIC URTICARIA: ICD-10-CM

## 2023-11-29 LAB
IGE SERPL-ACNC: 696 IU/ML (ref 0–214)
THYROPEROXIDASE AB SERPL-ACNC: <28 IU/ML

## 2023-11-29 PROCEDURE — 86038 ANTINUCLEAR ANTIBODIES: CPT

## 2023-11-29 PROCEDURE — 86352 CELL FUNCTION ASSAY W/STIM: CPT

## 2023-11-29 PROCEDURE — 86235 NUCLEAR ANTIGEN ANTIBODY: CPT

## 2023-11-29 PROCEDURE — 86376 MICROSOMAL ANTIBODY EACH: CPT

## 2023-11-29 PROCEDURE — 36415 COLL VENOUS BLD VENIPUNCTURE: CPT

## 2023-11-29 PROCEDURE — 86225 DNA ANTIBODY NATIVE: CPT

## 2023-11-29 PROCEDURE — 82785 ASSAY OF IGE: CPT

## 2023-11-29 PROCEDURE — 83520 IMMUNOASSAY QUANT NOS NONAB: CPT

## 2023-12-01 LAB — TRYPTASE SERPL-MCNC: 6.5 UG/L

## 2023-12-03 LAB
ANA PATTERN: ABNORMAL
ANA SER QL HEP2 SUBST: POSITIVE
ANA TITR SER IF: ABNORMAL {TITER}
CENTROMERE B AB SER-ACNC: <0.2 AI
CHROMATIN AB SERPL-ACNC: <0.2 AI
DSDNA AB SER-ACNC: <1 IU/ML
ENA JO1 AB SER QL IA: <0.2 AI
ENA RNP AB SER IA-ACNC: <0.2 AI
ENA SCL70 AB SER QL IA: <0.2 AI
ENA SM AB SER IA-ACNC: <0.2 AI
ENA SM+RNP AB SER QL IA: <0.2 AI
ENA SS-A AB SER IA-ACNC: 0.2 AI
ENA SS-B AB SER IA-ACNC: <0.2 AI
RIBOSOMAL P AB SER-ACNC: <0.2 AI

## 2023-12-10 LAB — URTICARIA-INDUCING ACTIVITY: 0.4 INDEX UNIT

## 2023-12-11 DIAGNOSIS — I10 HTN (HYPERTENSION), BENIGN: ICD-10-CM

## 2023-12-11 RX ORDER — CHLORTHALIDONE 25 MG/1
12.5 TABLET ORAL DAILY
Qty: 45 TABLET | Refills: 0 | Status: SHIPPED | OUTPATIENT
Start: 2023-12-11 | End: 2023-12-22 | Stop reason: SDUPTHER

## 2023-12-12 ENCOUNTER — TELEPHONE (OUTPATIENT)
Dept: ALLERGY | Facility: CLINIC | Age: 43
End: 2023-12-12
Payer: COMMERCIAL

## 2023-12-12 NOTE — TELEPHONE ENCOUNTER
Please let patient know that labs are within normal limits except for positive KARAN (which occurs in 20% of the normal population).  IgE is elevated which can be seen with chronic hives.  Continue therapy plan per last visit.

## 2023-12-14 DIAGNOSIS — I10 HTN (HYPERTENSION), BENIGN: ICD-10-CM

## 2023-12-15 NOTE — TELEPHONE ENCOUNTER
Result Communication    Resulted Orders   Tryptase   Result Value Ref Range    Tryptase 6.5 <=10.9 ug/L      Comment:      Performed By: [facility]  [address] [place], [place] [id number]  : Peng Novak MD, PhD  CLIA Number: [id number]   Urticaria-Inducing Activity (CU Index)   Result Value Ref Range    Urticaria-Inducing Activity 0.4 <=10.0 Index Unit      Comment:      INTERPRETIVE INFORMATION: Urticaria-Inducing Activity     A value of greater than 10 units suggests the presence of basophil   stimulating autoantibodies (or other serum factors).    This test was developed and its performance characteristics   determined by [facility]. It has not been cleared or   approved by the [place] Food and Drug Administration. This test was   performed in a CLIA certified laboratory and is intended for   clinical purposes.  Performed By: [facility]  [address] [place], [place] [id number]  : Peng Novak MD, PhD  CLIA Number: [id number]   Thyroid Peroxidase (TPO) Antibody   Result Value Ref Range    Thyroid Peroxidase (TPO) Antibody <28 <=60 IU/mL    Narrative    Negative: <=60 U/mL  Positive:  >60 U/mL   Immunoglobulin IgE   Result Value Ref Range    IgE 696 (H) 0 - 214 IU/mL   KARAN with Reflex to CHARLIE   Result Value Ref Range    KARAN Positive (A) Negative      Comment:      The Antinuclear Antibody (KARAN) test was performed using  indirect immunofluorescence assay with HEp-2 cells slide.    KARAN Pattern Speckled     KARAN Titer 1:80    CHARLIE Panel   Result Value Ref Range    Anti-SM <0.2 <1.0 AI      Comment:      < 1.0 = NEGATIVE  >=1.0 = POSITIVE    Anti-RNP <0.2 <1.0 AI      Comment:      < 1.0 = NEGATIVE  >=1.0 = POSITIVE    Anti-SM/RNP <0.2 <1.0 AI      Comment:      < 1.0 = NEGATIVE  >=1.0 = POSITIVE    Anti-SSA 0.2 <1.0 AI      Comment:      < 1.0 = NEGATIVE  >=1.0 = POSITIVE    Anti-SSB <0.2 <1.0 AI      Comment:      < 1.0 = NEGATIVE  >=1.0 =  POSITIVE    Anti-SCL-70 <0.2 <1.0 AI      Comment:      < 1.0 = NEGATIVE  >=1.0 = POSITIVE    Anti-BERLIN-1 IgG <0.2 <1.0 AI      Comment:      < 1.0 = NEGATIVE  >=1.0 = POSITIVE    Anti-Chromatin <0.2 <1.0 AI      Comment:      < 1.0 = NEGATIVE  >=1.0 = POSITIVE    Anti-Centromere <0.2 <1.0 AI      Comment:      < 1.0 = NEGATIVE  >=1.0 = POSITIVE    ANTI-RIBOSOMAL P <0.2 <1.0 AI      Comment:      < 1.0 = NEGATIVE  >=1.0 = POSITIVE    Anti-DNA (DS) <1.0 <5.0 IU/mL      Comment:      NEGATIVE: <= 4 IU/ML  EQUIVOCAL: 5- 9 IU/ML  POSITIVE: >=10 IU/ML       10:09 AM      Results were successfully communicated with the patient and they acknowledged their understanding.

## 2023-12-22 RX ORDER — CHLORTHALIDONE 25 MG/1
12.5 TABLET ORAL DAILY
Qty: 45 TABLET | Refills: 0 | Status: SHIPPED | OUTPATIENT
Start: 2023-12-22 | End: 2024-03-25

## 2023-12-22 NOTE — TELEPHONE ENCOUNTER
Rx Refill Request Telephone Encounter    Name:  Jose Maria Apodaca  :  [phone]  Specific Pharmacy location:  [facility]

## 2024-01-26 ENCOUNTER — APPOINTMENT (OUTPATIENT)
Dept: SLEEP MEDICINE | Facility: CLINIC | Age: 44
End: 2024-01-26
Payer: COMMERCIAL

## 2024-01-31 ENCOUNTER — APPOINTMENT (OUTPATIENT)
Dept: ALLERGY | Facility: HOSPITAL | Age: 44
End: 2024-01-31
Payer: COMMERCIAL

## 2024-03-24 DIAGNOSIS — I10 HTN (HYPERTENSION), BENIGN: ICD-10-CM

## 2024-03-25 RX ORDER — CHLORTHALIDONE 25 MG/1
12.5 TABLET ORAL DAILY
Qty: 15 TABLET | Refills: 0 | Status: SHIPPED | OUTPATIENT
Start: 2024-03-25 | End: 2024-04-30

## 2024-04-27 DIAGNOSIS — I10 HTN (HYPERTENSION), BENIGN: ICD-10-CM

## 2024-04-30 RX ORDER — CHLORTHALIDONE 25 MG/1
12.5 TABLET ORAL DAILY
Qty: 45 TABLET | Refills: 0 | Status: SHIPPED | OUTPATIENT
Start: 2024-04-30 | End: 2024-05-10

## 2024-05-06 ENCOUNTER — APPOINTMENT (OUTPATIENT)
Dept: SLEEP MEDICINE | Facility: CLINIC | Age: 44
End: 2024-05-06
Payer: COMMERCIAL

## 2024-05-10 DIAGNOSIS — I10 HTN (HYPERTENSION), BENIGN: ICD-10-CM

## 2024-05-10 RX ORDER — CHLORTHALIDONE 25 MG/1
12.5 TABLET ORAL DAILY
Qty: 15 TABLET | Refills: 5 | Status: SHIPPED | OUTPATIENT
Start: 2024-05-10

## 2024-05-10 NOTE — TELEPHONE ENCOUNTER
Rx Refill Request Telephone Encounter    Name:  Jose Maria Apodaca  :  [phone]  Specific Pharmacy location:  [facility] STATES PHARMACY DID'T RECEIVE ORIGINAL